# Patient Record
Sex: FEMALE | Race: WHITE | NOT HISPANIC OR LATINO | URBAN - METROPOLITAN AREA
[De-identification: names, ages, dates, MRNs, and addresses within clinical notes are randomized per-mention and may not be internally consistent; named-entity substitution may affect disease eponyms.]

---

## 2024-04-16 ENCOUNTER — HOSPITAL ENCOUNTER (OUTPATIENT)
Dept: RADIOLOGY | Facility: CLINIC | Age: 39
Discharge: HOME | End: 2024-04-16
Payer: COMMERCIAL

## 2024-04-16 ENCOUNTER — OFFICE VISIT (OUTPATIENT)
Dept: URGENT CARE | Facility: CLINIC | Age: 39
End: 2024-04-16
Payer: COMMERCIAL

## 2024-04-16 VITALS
SYSTOLIC BLOOD PRESSURE: 125 MMHG | WEIGHT: 230 LBS | TEMPERATURE: 97.7 F | RESPIRATION RATE: 14 BRPM | DIASTOLIC BLOOD PRESSURE: 86 MMHG | HEIGHT: 66 IN | OXYGEN SATURATION: 98 % | HEART RATE: 80 BPM | BODY MASS INDEX: 36.96 KG/M2

## 2024-04-16 DIAGNOSIS — M79.672 LEFT FOOT PAIN: ICD-10-CM

## 2024-04-16 DIAGNOSIS — S93.602A SPRAIN OF LEFT FOOT, INITIAL ENCOUNTER: Primary | ICD-10-CM

## 2024-04-16 PROCEDURE — 73630 X-RAY EXAM OF FOOT: CPT | Mod: LT

## 2024-04-16 PROCEDURE — L3260 AMBULATORY SURGICAL BOOT EAC: HCPCS

## 2024-04-16 PROCEDURE — 99204 OFFICE O/P NEW MOD 45 MIN: CPT

## 2024-04-16 PROCEDURE — 73630 X-RAY EXAM OF FOOT: CPT | Mod: LEFT SIDE | Performed by: RADIOLOGY

## 2024-04-16 PROCEDURE — 1036F TOBACCO NON-USER: CPT

## 2024-04-16 RX ORDER — CETIRIZINE HYDROCHLORIDE 10 MG/1
10 TABLET ORAL DAILY
COMMUNITY
Start: 2023-05-01

## 2024-04-16 ASSESSMENT — ENCOUNTER SYMPTOMS
WOUND: 0
NUMBNESS: 0
BRUISES/BLEEDS EASILY: 0
JOINT SWELLING: 1
ARTHRALGIAS: 1
COLOR CHANGE: 1
WEAKNESS: 0

## 2024-04-16 ASSESSMENT — PAIN SCALES - GENERAL: PAINLEVEL: 3

## 2024-04-16 NOTE — PROGRESS NOTES
Subjective   Patient ID: Brandy Mcnamara is a 38 y.o. female.      Patient presents urgent care for complaints of left foot pain for the last 2 weeks.  Patient states that she had accidentally kicked her metal bed frame and had some slight discomfort however she went and worked out later that evening and the pain had gotten significantly worse.  Patient states that he thought it would improve over time however she has a significant amount of pain when standing on the ball of her left foot.  Patient denies any prior injury to the left foot.  Patient denies any numbness or tingling.      Review of Systems   Musculoskeletal:  Positive for arthralgias (left foot) and joint swelling (left foot). Negative for gait problem.   Skin:  Positive for color change (bruising). Negative for wound.   Neurological:  Negative for weakness and numbness.   Hematological:  Does not bruise/bleed easily.     Objective   Physical Exam  Constitutional:       Appearance: Normal appearance.   HENT:      Head: Normocephalic and atraumatic.   Eyes:      Extraocular Movements: Extraocular movements intact.      Conjunctiva/sclera: Conjunctivae normal.      Pupils: Pupils are equal, round, and reactive to light.   Cardiovascular:      Rate and Rhythm: Normal rate and regular rhythm.      Pulses: Normal pulses.      Heart sounds: Normal heart sounds.   Pulmonary:      Effort: Pulmonary effort is normal.      Breath sounds: Normal breath sounds.   Musculoskeletal:      Right foot: Normal.      Left foot: Normal range of motion. Swelling and tenderness (posterior to toes) present. No deformity.   Skin:     General: Skin is warm and dry.      Capillary Refill: Capillary refill takes less than 2 seconds.   Neurological:      General: No focal deficit present.      Mental Status: She is alert and oriented to person, place, and time.       Discussed with patient Rest, Ice, Compression, Elevation. Tylenol/Ibuprofen can be used for pain. Post op shoe placed  on patient in office today, informed her that if she has no improvement in symptoms she is to follow up with orthopedics. I personally reviewed images and do not see any signs of acute fracture or dislocation.     Assessment/Plan   Problem List Items Addressed This Visit             ICD-10-CM    Sprain of left foot - Primary S93.602A     Other Visit Diagnoses         Codes    Left foot pain     M79.672    Relevant Orders    XR foot left 3+ views            Patient disposition: Home

## 2024-05-07 ENCOUNTER — APPOINTMENT (OUTPATIENT)
Dept: ORTHOPEDIC SURGERY | Facility: CLINIC | Age: 39
End: 2024-05-07
Payer: COMMERCIAL

## 2024-05-09 ENCOUNTER — OFFICE VISIT (OUTPATIENT)
Dept: ORTHOPEDIC SURGERY | Facility: CLINIC | Age: 39
End: 2024-05-09
Payer: COMMERCIAL

## 2024-05-09 DIAGNOSIS — S93.602A SPRAIN OF LEFT FOOT, INITIAL ENCOUNTER: ICD-10-CM

## 2024-05-09 PROCEDURE — 1036F TOBACCO NON-USER: CPT

## 2024-05-09 PROCEDURE — 99203 OFFICE O/P NEW LOW 30 MIN: CPT

## 2024-05-09 RX ORDER — METHYLPREDNISOLONE 4 MG/1
4 TABLET ORAL ONCE
Qty: 21 TABLET | Refills: 0 | Status: SHIPPED | OUTPATIENT
Start: 2024-05-09 | End: 2024-05-09

## 2024-05-09 NOTE — PROGRESS NOTES
Subjective    Patient ID: Brandy Mcnamara is a 38 y.o. female.    Chief Complaint: Pain of the Left Ankle (FOR 6 WEEKS. PATIENT KICKED HER BED.)    HPI  This is a pleasant 48-year-old female presenting to the office for evaluation of left foot pain, which has been ongoing for approximately 6 weeks.  States that 6 weeks ago, she accidentally kicked a metal frame of her bed against the dorsal aspect of her left foot.  She presented to an urgent care at that time, or multiple view x-rays of her left foot were obtained, without evidence of acute fracture or dislocation.  She was provided a postoperative shoe and told to follow-up with a medical professional if pain continued.  Patient states that since the injury, she has had swelling and redness to the area just below the third and fourth toe.  This area is tender to touch.  It is difficult for her to ambulate without pain.  It is painful for her to stand up on her toes.  States that she does feel better in high heels.  She has worn arch support with minimal relief of symptoms.  She denies ankle pain or limited range of motion of the ankle.  Denies numbness and paresthesia of left foot and ankle.  She has been taking Advil Tylenol and taking turmeric with minimal relief of symptoms.  She works at the [employer] at the  working long hours on her feet.  She does notice increased pain and swelling after long hours at work.    The patient's past medical, surgical, family, and social history as well as allergies and medications were reviewed and updated in the chart.    Objective   Ortho Exam  Pleasant in no acute distress.  Walks in the office today with a mildly antalgic gait, no use of assistive device.  She is wearing well supported tennis shoes.  Left foot appearing with minimal soft tissue swelling and erythema noted to dorsal aspect of left foot below fourth and third toe.  This area is tender to touch.  There are no other significant areas of tenderness  to left foot or ankle.  She has full range of motion of left foot and ankle without pain elicited.  She can move all left toe digits without discomfort.  Sensation is intact to light touch.    Image Results:  XR foot left 3+ views  Narrative: Interpreted By:  Atul Ortez,   STUDY:  XR FOOT LEFT 3+ VIEWS;  4/16/2024 2:24 pm      INDICATION:  Signs/Symptoms:foot pain x 2 weeks after hitting on metal bed frame.      COMPARISON:  None.      ACCESSION NUMBER(S):  [id number]      ORDERING CLINICIAN:  LUCIANA YEE      FINDINGS:  Bony structures:  An ossification is adjacent to the tip of the  lateral malleolus, probably ununited ossicle, or possibly from  trauma. The bony structures otherwise appear intact.      Joint spaces:  Maintained      Soft tissues:  Unremarkable without significant edema or radiodense  foreign body      Other:  None significant      Impression: No acute findings.      MACRO:  None      Signed by: Atul Ortez 4/16/2024 3:11 PM  Dictation workstation:   [id number]  Multiple view x-rays of the left foot obtained on April 16, 2024 personally reviewed, without evidence of acute findings or bony abnormality.  No evidence of acute fracture or dislocation.    Assessment/Plan   Encounter Diagnoses:  Sprain of left foot, initial encounter left dorsal foot pain below third and fourth toe digits sustained from an injury approximately 6 weeks ago    Plan: Discussion with patient about left foot sprain with review of x-rays and conservative treatment options.  She can continue with well supported tennis shoes with arch support.  She can continue with Advil Tylenol and turmeric.  She should avoid aggravating activities.  She should limit weightbearing as tolerated.  I have referred patient to [facility] specialist for further evaluation.  I have prescribed patient a Medrol Dosepak to help decrease pain inflammation.  She is aware not to take anti-inflammatories while Medrol Dosepak, but can  supplement with Tylenol.  She will follow-up with the foot and ankle specialist.      Orders Placed This Encounter    methylPREDNISolone (Medrol Dospak) 4 mg tablets

## 2024-09-04 ENCOUNTER — HOSPITAL ENCOUNTER (EMERGENCY)
Facility: HOSPITAL | Age: 39
Discharge: HOME | End: 2024-09-04
Payer: COMMERCIAL

## 2024-09-04 ENCOUNTER — APPOINTMENT (OUTPATIENT)
Dept: RADIOLOGY | Facility: HOSPITAL | Age: 39
End: 2024-09-04
Payer: COMMERCIAL

## 2024-09-04 VITALS
TEMPERATURE: 97.9 F | HEART RATE: 71 BPM | SYSTOLIC BLOOD PRESSURE: 126 MMHG | RESPIRATION RATE: 18 BRPM | OXYGEN SATURATION: 96 % | WEIGHT: 240 LBS | DIASTOLIC BLOOD PRESSURE: 82 MMHG | HEIGHT: 66 IN | BODY MASS INDEX: 38.57 KG/M2

## 2024-09-04 DIAGNOSIS — S92.534A CLOSED NONDISPLACED FRACTURE OF DISTAL PHALANX OF LESSER TOE OF RIGHT FOOT, INITIAL ENCOUNTER: ICD-10-CM

## 2024-09-04 DIAGNOSIS — S91.114A LACERATION OF LESSER TOE OF RIGHT FOOT WITHOUT FOREIGN BODY PRESENT OR DAMAGE TO NAIL, INITIAL ENCOUNTER: Primary | ICD-10-CM

## 2024-09-04 PROCEDURE — 73660 X-RAY EXAM OF TOE(S): CPT | Mod: RT

## 2024-09-04 PROCEDURE — 90471 IMMUNIZATION ADMIN: CPT

## 2024-09-04 PROCEDURE — 73660 X-RAY EXAM OF TOE(S): CPT | Mod: RIGHT SIDE | Performed by: RADIOLOGY

## 2024-09-04 PROCEDURE — 12001 RPR S/N/AX/GEN/TRNK 2.5CM/<: CPT

## 2024-09-04 PROCEDURE — 90715 TDAP VACCINE 7 YRS/> IM: CPT

## 2024-09-04 PROCEDURE — 2500000004 HC RX 250 GENERAL PHARMACY W/ HCPCS (ALT 636 FOR OP/ED)

## 2024-09-04 PROCEDURE — 99283 EMERGENCY DEPT VISIT LOW MDM: CPT | Mod: 25

## 2024-09-04 ASSESSMENT — PAIN SCALES - GENERAL: PAINLEVEL_OUTOF10: 0 - NO PAIN

## 2024-09-04 ASSESSMENT — COLUMBIA-SUICIDE SEVERITY RATING SCALE - C-SSRS
2. HAVE YOU ACTUALLY HAD ANY THOUGHTS OF KILLING YOURSELF?: NO
6. HAVE YOU EVER DONE ANYTHING, STARTED TO DO ANYTHING, OR PREPARED TO DO ANYTHING TO END YOUR LIFE?: NO
1. IN THE PAST MONTH, HAVE YOU WISHED YOU WERE DEAD OR WISHED YOU COULD GO TO SLEEP AND NOT WAKE UP?: NO

## 2024-09-04 ASSESSMENT — PAIN - FUNCTIONAL ASSESSMENT: PAIN_FUNCTIONAL_ASSESSMENT: 0-10

## 2024-09-04 ASSESSMENT — LIFESTYLE VARIABLES
EVER FELT BAD OR GUILTY ABOUT YOUR DRINKING: NO
HAVE YOU EVER FELT YOU SHOULD CUT DOWN ON YOUR DRINKING: NO
EVER HAD A DRINK FIRST THING IN THE MORNING TO STEADY YOUR NERVES TO GET RID OF A HANGOVER: NO
HAVE PEOPLE ANNOYED YOU BY CRITICIZING YOUR DRINKING: NO
TOTAL SCORE: 0

## 2024-09-04 NOTE — ED TRIAGE NOTES
PT PRESENTS TO ED FROM HOME VIA PRIVATE AUTO FOR TOE INJURY. PT STATES SHE DROPPED SHARON JAR ON PINKY TOE.

## 2024-09-04 NOTE — DISCHARGE INSTRUCTIONS
You were seen emergency room today for laceration to your right pinky toe.  The laceration was fixed with stitches.  The stitches will be removed in the next out of 10 days.  Please keep area dry for at least 24 hours and afterwards you can apply triple antibiotic ointment.  You should return the emergency room if you develop any worsening pain, signs of infection including redness, swelling, or purulent drainage, or difficulty with ambulation.  X-ray shows a questionable fracture of the very tip of your right toe, but you do not have tenderness to this area.  At this time, we will not treat you with any other antibiotics as the fracture is not in the direct area of your laceration.  You should follow-up with a specialist regarding the fracture just to make sure there is nothing else to do.  I did provide you with referral to an orthopedic surgeon.  Please also follow-up with your primary care provider.

## 2024-09-04 NOTE — ED PROVIDER NOTES
HPI   Chief Complaint   Patient presents with   • Toe Injury       Brandy is a 39-year-old female with no pertinent past medical history presenting to emergency room with a right toe laceration.  Patient states just prior to arrival, she dropped a hammad jar of her cold brew on her right, fifth toe.  She stained a laceration to the mid, right fifth toe.  Patient has been able to achieve hemostasis.  She denies any pain in the area.  She has been able to ambulate and move her toe without difficulty.  Last tetanus was over 5 years ago.  No numbness, tingling, weakness.  Patient denies any history of T2DM or immunosuppression.            Patient History   History reviewed. No pertinent past medical history.  History reviewed. No pertinent surgical history.  No family history on file.  Social History     Tobacco Use   • Smoking status: Never   • Smokeless tobacco: Never   Vaping Use   • Vaping status: Never Used   Substance Use Topics   • Alcohol use: Not on file   • Drug use: Never       Physical Exam   ED Triage Vitals [09/04/24 1837]   Temperature Heart Rate Respirations BP   36.6 °C (97.9 °F) 82 18 126/81      Pulse Ox Temp Source Heart Rate Source Patient Position   96 % Temporal Monitor Sitting      BP Location FiO2 (%)     Right arm --       Physical Exam  Constitutional:       Appearance: She is obese.   HENT:      Mouth/Throat:      Mouth: Mucous membranes are moist.      Pharynx: Oropharynx is clear.   Eyes:      Extraocular Movements: Extraocular movements intact.   Cardiovascular:      Rate and Rhythm: Normal rate and regular rhythm.      Pulses: Normal pulses.      Heart sounds: Normal heart sounds.   Pulmonary:      Effort: Pulmonary effort is normal.      Breath sounds: Normal breath sounds.   Abdominal:      General: Abdomen is flat.      Palpations: Abdomen is soft.   Musculoskeletal:         General: Signs of injury present.      Cervical back: Normal range of motion. No rigidity.      Comments: Patient  with a 0.5 cm, horizontal linear laceration overlying the fifth right MTP. There is some fat layer exposure, but no obvious tendon involvement. No nail damage. Patient has intact passive and active ROM. Intact strength and sensation. Pulses strong.    Neurological:      Mental Status: She is alert.         ED Course & MDM   ED Course as of 09/04/24 2125   Wed Sep 04, 2024   1954 XR toe right 2+ views  Questionable subtle avulsion fracture involving distal head of the  proximal phalanx, 5th toe. Correlation with point tenderness  recommended. No additional fractures or dislocation. No radiopaque  foreign body.   [AH]      ED Course User Index  [AH] Nunu Tilley PA-C         Diagnoses as of 09/04/24 2125   Laceration of lesser toe of right foot without foreign body present or damage to nail, initial encounter   Closed nondisplaced fracture of distal phalanx of lesser toe of right foot, initial encounter                 No data recorded     Denise Coma Scale Score: 15 (09/04/24 1838 : Bessy Ellis RN)                           Medical Decision Making  Brandy is a 39-year-old female with no pertinent past medical history presenting to emergency room with a right toe laceration.     Medical Decision Making: She did not appear ill or toxic.  Vital signs reviewed and stable. Patient with a laceration to the right fifth MTP. Hemostasis achieved. X-ray of the right toe was obtained and does not show any obvious foreign body or fracture per my interpretation. Official read demonstrates concerns for avulsion fracture of the distal phalanx with recommendations for clinical tenderness. She does not have any tenderness to palpation of this area and the laceration is more proximal to the area of fracture.  At this time, we will not pursue antibiotic therapy for the fracture or any other treatment.  Laceration was cleansed thoroughly with chlorhexidine and normal saline. Patient was anesthestized with lidocaine with  epi. Laceration was well approximated with four, 4-0 stitches. Tetanus was updated.  Patient was instructed to have the sutures removed in the next 7 to 10 days.  She is recommended on good wound care including keeping the area dry for at least 24 hours, applying triple antibiotic, and, and monitoring for signs of infection including redness, purulent drainage, extreme pain.  We discussed return precautions including signs of infection and worsening pain.  I did recommend that she call her primary care provider regarding her visit to the ED today.  I did also provide her with a referral to an orthopedic surgeon regarding the questionable fracture.  Again, we will not do any further intervention for the fracture today.  Patient agreeable plan all questions were addressed.     Differential diagnoses considered: Laceration, abrasion, fracture, dislocation, ligamentous tendinous injury, etc.     Medications given: Tetanus     Diagnosis: Laceration of lesser toe of right foot without foreign body present or damage to nail, initial encounter, closed nondisplaced fracture of distal phalanx of lesser toe    Plan: Discharge          Procedure  Laceration Repair    Performed by: Nunu Tilley PA-C  Authorized by: Nunu Tilley PA-C    Consent:     Consent obtained:  Verbal    Consent given by:  Patient    Risks, benefits, and alternatives were discussed: yes      Risks discussed:  Infection, pain, need for additional repair and nerve damage    Alternatives discussed:  No treatment and delayed treatment  Universal protocol:     Patient identity confirmed:  Verbally with patient  Anesthesia:     Anesthesia method:  Local infiltration    Local anesthetic:  Lidocaine 1% w/o epi  Laceration details:     Location:  Toe    Toe location:  R little toe    Length (cm):  0.5  Pre-procedure details:     Preparation:  Patient was prepped and draped in usual sterile fashion  Treatment:     Amount of cleaning:  Standard    Irrigation  solution:  Sterile saline  Skin repair:     Repair method:  Sutures    Suture size:  4-0    Suture material:  Nylon    Suture technique:  Simple interrupted    Number of sutures:  4  Approximation:     Approximation:  Close  Repair type:     Repair type:  Simple  Post-procedure details:     Dressing:  Non-adherent dressing    Procedure completion:  Tolerated       Nunu Tilley PA-C  09/04/24 1493

## 2024-12-28 ENCOUNTER — HOSPITAL ENCOUNTER (EMERGENCY)
Facility: HOSPITAL | Age: 39
Discharge: HOME | End: 2024-12-28
Attending: STUDENT IN AN ORGANIZED HEALTH CARE EDUCATION/TRAINING PROGRAM
Payer: COMMERCIAL

## 2024-12-28 VITALS
OXYGEN SATURATION: 98 % | SYSTOLIC BLOOD PRESSURE: 142 MMHG | RESPIRATION RATE: 18 BRPM | BODY MASS INDEX: 37.77 KG/M2 | WEIGHT: 235 LBS | HEART RATE: 94 BPM | HEIGHT: 66 IN | TEMPERATURE: 97.9 F | DIASTOLIC BLOOD PRESSURE: 87 MMHG

## 2024-12-28 DIAGNOSIS — N20.0 NEPHROLITHIASIS: Primary | ICD-10-CM

## 2024-12-28 LAB
ANION GAP SERPL CALC-SCNC: 12 MMOL/L (ref 10–20)
APPEARANCE UR: CLEAR
BILIRUB UR STRIP.AUTO-MCNC: NEGATIVE MG/DL
BUN SERPL-MCNC: 10 MG/DL (ref 6–23)
CALCIUM SERPL-MCNC: 8.9 MG/DL (ref 8.6–10.3)
CHLORIDE SERPL-SCNC: 103 MMOL/L (ref 98–107)
CO2 SERPL-SCNC: 24 MMOL/L (ref 21–32)
COLOR UR: COLORLESS
CREAT SERPL-MCNC: 0.66 MG/DL (ref 0.5–1.05)
EGFRCR SERPLBLD CKD-EPI 2021: >90 ML/MIN/1.73M*2
GLUCOSE SERPL-MCNC: 83 MG/DL (ref 74–99)
GLUCOSE UR STRIP.AUTO-MCNC: NORMAL MG/DL
KETONES UR STRIP.AUTO-MCNC: NEGATIVE MG/DL
LEUKOCYTE ESTERASE UR QL STRIP.AUTO: ABNORMAL
NITRITE UR QL STRIP.AUTO: NEGATIVE
PH UR STRIP.AUTO: 7.5 [PH]
POTASSIUM SERPL-SCNC: 4 MMOL/L (ref 3.5–5.3)
PROT UR STRIP.AUTO-MCNC: NEGATIVE MG/DL
RBC # UR STRIP.AUTO: ABNORMAL /UL
RBC #/AREA URNS AUTO: ABNORMAL /HPF
SODIUM SERPL-SCNC: 135 MMOL/L (ref 136–145)
SP GR UR STRIP.AUTO: 1
UROBILINOGEN UR STRIP.AUTO-MCNC: NORMAL MG/DL
WBC #/AREA URNS AUTO: ABNORMAL /HPF

## 2024-12-28 PROCEDURE — 36415 COLL VENOUS BLD VENIPUNCTURE: CPT | Performed by: STUDENT IN AN ORGANIZED HEALTH CARE EDUCATION/TRAINING PROGRAM

## 2024-12-28 PROCEDURE — 81001 URINALYSIS AUTO W/SCOPE: CPT | Performed by: STUDENT IN AN ORGANIZED HEALTH CARE EDUCATION/TRAINING PROGRAM

## 2024-12-28 PROCEDURE — 80048 BASIC METABOLIC PNL TOTAL CA: CPT | Performed by: STUDENT IN AN ORGANIZED HEALTH CARE EDUCATION/TRAINING PROGRAM

## 2024-12-28 PROCEDURE — 76775 US EXAM ABDO BACK WALL LIM: CPT | Performed by: STUDENT IN AN ORGANIZED HEALTH CARE EDUCATION/TRAINING PROGRAM

## 2024-12-28 PROCEDURE — 87186 SC STD MICRODIL/AGAR DIL: CPT | Mod: PARLAB | Performed by: STUDENT IN AN ORGANIZED HEALTH CARE EDUCATION/TRAINING PROGRAM

## 2024-12-28 PROCEDURE — 99284 EMERGENCY DEPT VISIT MOD MDM: CPT | Performed by: STUDENT IN AN ORGANIZED HEALTH CARE EDUCATION/TRAINING PROGRAM

## 2024-12-28 ASSESSMENT — COLUMBIA-SUICIDE SEVERITY RATING SCALE - C-SSRS
6. HAVE YOU EVER DONE ANYTHING, STARTED TO DO ANYTHING, OR PREPARED TO DO ANYTHING TO END YOUR LIFE?: NO
1. IN THE PAST MONTH, HAVE YOU WISHED YOU WERE DEAD OR WISHED YOU COULD GO TO SLEEP AND NOT WAKE UP?: NO
2. HAVE YOU ACTUALLY HAD ANY THOUGHTS OF KILLING YOURSELF?: NO

## 2024-12-28 ASSESSMENT — PAIN SCALES - GENERAL: PAINLEVEL_OUTOF10: 6

## 2024-12-28 ASSESSMENT — PAIN - FUNCTIONAL ASSESSMENT: PAIN_FUNCTIONAL_ASSESSMENT: 0-10

## 2024-12-29 LAB — HOLD SPECIMEN: NORMAL

## 2024-12-29 NOTE — ED PROVIDER NOTES
History of Present Illness     History provided by: Patient  Limitations to History: None  External Records Reviewed with Brief Summary: Outpatient progress note from 9/4/24 which showed ed visit for a toe laceration    HPI:  Brandy Mcnamara is a 39 y.o. female with a past medical history of nephrolithiasis who presents with flank pain.  The patient notes having right-sided flank pain that radiated down to her groin.  States it started by her flank but has progressed down lower.  She states that she feels like she passed the stone after coming here to the emergency department.  States she longer has the pain.  Has no nausea.  No fevers.  Minimal dysuria.  Does report having some hematuria.  No constipation or diarrhea.    Physical Exam   Triage vitals:  T 36.6 °C (97.9 °F)  HR 94  /87  RR 18  O2 98 % None (Room air)    General: Well appearing and in no acute distress.  HEENT: NCAT. PERRL. Oropharynx pink and moist.  CV: Regular rate, regular rhythm.   Resp: Normal effort.   GI: Soft. No tenderness to palpation. No rebound or guarding.  BACK: No CVA tenderness.  Extremities: No lower extremity edema. 2+ radial pulses bilaterally.  Neuro: Moving all extremities bilaterally.  Psych: Appropriate mood and affect    Medical Decision Making & ED Course   Medical Decision Making:  This is a 39 y.o. female with a past medical history of nephrolithiasis who presents with flank pain.  Patient noted having right-sided flank pain for 2 days.  Folic her stone had passed since has been here in the emergency department.  On exam she is well-appearing.  Benign physical exam.  Creatinine was near baseline. UA did show 3+ blood but only 1-2 RBCs.  Did discuss these findings with the patient.  Did a bedside ultrasound which showed no evidence of hydronephrosis.  Patient advised on symptomatic control at home.  Advised on return precautions and discharge.  ----    Differential diagnoses considered include but are not limited to:  nephrolithiasis, uti, pyelo, colitis, musculoskeletal pain     Social Determinants of Health which Significantly Impact Care: None identified     EKG Independent Interpretation: EKG not obtained    Independent Result Review and Interpretation: Relevant laboratory and radiographic results were reviewed and independently interpreted by myself.  As necessary, they are commented on in the ED Course.    Chronic conditions affecting the patient's care: As documented above in [facility]    The patient was discussed with the following consultants/services: None    Care Considerations: As documented above in [facility]    ED Course:  Diagnoses as of 12/28/24 2308   Nephrolithiasis     Disposition   As a result of the work-up, the patient was discharged home.  she was informed of her diagnosis and instructed to come back with any concerns or worsening of condition.  she and was agreeable to the plan as discussed above.  she was given the opportunity to ask questions.  All of the patient's questions were answered.    Procedure    Performed by: Maury Hobbs MD  Authorized by: Maury Hobbs MD        Genitourinary Indications: hematuria          Procedure: Renal Ultrasound    Findings:  Right Kidney: The RIGHT kidney was visualized and was NEGATIVE for hydronephrosis.  Left Kidney: The LEFT kidney was visualized and was NEGATIVE for hydronephrosis.  Bladder: The bladder was visualized and was DECOMPRESSED.    Impression:  Renal: normal exam          Maury Hobbs MD  Emergency Medicine     Maury Hobbs MD  12/28/24 4414

## 2024-12-31 LAB — BACTERIA UR CULT: ABNORMAL

## 2025-01-02 ENCOUNTER — TELEPHONE (OUTPATIENT)
Dept: PHARMACY | Facility: HOSPITAL | Age: 40
End: 2025-01-02
Payer: COMMERCIAL

## 2025-01-02 NOTE — PROGRESS NOTES
EDPD Note: Rapid Result Review    I reviewed Brandy Mcnamara 's chart regarding a positive urine culture/result that was taken during their recent emergency room visit. The patient was not told about these results prior to leaving the emergency department. Therefore, patient was contacted and given appropriate education.     Patient presented to the ED with flank pain. Reported minimal dysuria. Patient has a history of kidney stones. Said that she felt like she passed a stone after arriving at the ED. Diagnosis in ED was kidney stone. Patient was not discharged with abx for UTI.    Patient reports that she has had a UTI before and reports that it was different than the urinary symptoms is is experiencing. Believes her symptoms are from kidney stones. Advised patient to follow up for any worsening or change in symptoms.        Susceptibility data from last 90 days.  Collected Specimen Info Organism Amoxicillin/Clavulanate Ampicillin Ampicillin/Sulbactam Cefazolin Cefazolin (uncomplicated UTIs only) Ceftriaxone Ciprofloxacin Gentamicin Nitrofurantoin Piperacillin/Tazobactam   12/28/24 Urine from Clean Catch/Voided Escherichia coli  S  R  R  R  S  S  S  S  S  S     Collected Specimen Info Organism Trimethoprim/Sulfamethoxazole   12/28/24 Urine from Clean Catch/Voided Escherichia coli  S     Admission on 12/28/2024, Discharged on 12/28/2024   Component Date Value Ref Range Status    Color, Urine 12/28/2024 Colorless (N)  Light-Yellow, Yellow, Dark-Yellow Final    Appearance, Urine 12/28/2024 Clear  Clear Final    Specific Gravity, Urine 12/28/2024 1.000 (N)  1.005 - 1.035 Final    pH, Urine 12/28/2024 7.5  5.0, 5.5, 6.0, 6.5, 7.0, 7.5, 8.0 Final    Protein, Urine 12/28/2024 NEGATIVE  NEGATIVE, 10 (TRACE), 20 (TRACE) mg/dL Final    Glucose, Urine 12/28/2024 Normal  Normal mg/dL Final    Blood, Urine 12/28/2024 OVER (3+) (A)  NEGATIVE Final    Ketones, Urine 12/28/2024 NEGATIVE  NEGATIVE mg/dL Final    Bilirubin, Urine  12/28/2024 NEGATIVE  NEGATIVE Final    Urobilinogen, Urine 12/28/2024 Normal  Normal mg/dL Final    Nitrite, Urine 12/28/2024 NEGATIVE  NEGATIVE Final    Leukocyte Esterase, Urine 12/28/2024 500 Ruba/µL (A)  NEGATIVE Final    Extra Tube 12/28/2024 Hold for add-ons.   Final    Auto resulted.    Glucose 12/28/2024 83  74 - 99 mg/dL Final    Sodium 12/28/2024 135 (L)  136 - 145 mmol/L Final    Potassium 12/28/2024 4.0  3.5 - 5.3 mmol/L Final    Chloride 12/28/2024 103  98 - 107 mmol/L Final    Bicarbonate 12/28/2024 24  21 - 32 mmol/L Final    Anion Gap 12/28/2024 12  10 - 20 mmol/L Final    Urea Nitrogen 12/28/2024 10  6 - 23 mg/dL Final    Creatinine 12/28/2024 0.66  0.50 - 1.05 mg/dL Final    eGFR 12/28/2024 >90  >60 mL/min/1.73m*2 Final    Calculations of estimated GFR are performed using the 2021 CKD-EPI Study Refit equation without the race variable for the IDMS-Traceable creatinine methods.  https://jasn.asnjournals.org/content/early/2021/09/22/ASN.0812261352    Calcium 12/28/2024 8.9  8.6 - 10.3 mg/dL Final    WBC, Urine 12/28/2024 6-10 (A)  1-5, NONE /HPF Final    RBC, Urine 12/28/2024 1-2  NONE, 1-2, 3-5 /HPF Final    Urine Culture 12/28/2024 20,000 - 80,000 CFU/mL Escherichia coli (A)   Final       No further follow up needed from EDPD Team.     If there are any other questions for the ED Post-Discharge Culture Follow Up Team, please contact [phone]. Fax: [phone].    Teetee Goldberg, FransiscoD

## 2025-02-20 ENCOUNTER — HOSPITAL ENCOUNTER (OUTPATIENT)
Dept: CARDIOLOGY | Facility: CLINIC | Age: 40
Discharge: HOME | End: 2025-02-20
Payer: COMMERCIAL

## 2025-02-20 DIAGNOSIS — R00.2 PALPITATIONS: ICD-10-CM

## 2025-02-20 PROCEDURE — 93246 EXT ECG>7D<15D RECORDING: CPT

## 2025-03-26 ENCOUNTER — TELEPHONE (OUTPATIENT)
Dept: CARDIOLOGY | Facility: CLINIC | Age: 40
End: 2025-03-26
Payer: COMMERCIAL